# Patient Record
(demographics unavailable — no encounter records)

---

## 2024-11-19 NOTE — PHYSICAL EXAM
[Alert] : alert [Normal Voice/Communication] : normal voice/communication [Healthy Appearing] : healthy appearing [No Acute Distress] : no acute distress [Sclera] : the sclera and conjunctiva were normal [Hearing Threshold Finger Rub Not [place]] : hearing was normal [Normal Lips/Gums] : the lips and gums were normal [Oropharynx] : the oropharynx was normal [Normal Appearance] : the appearance of the neck was normal [No Neck Mass] : no neck mass was observed [No Acc Muscle Use] : no accessory muscle use [No Respiratory Distress] : no respiratory distress [Respiration, Rhythm And Depth] : normal respiratory rhythm and effort [Auscultation Breath Sounds / Voice Sounds] : lungs were clear to auscultation bilaterally [Heart Rate And Rhythm] : heart rate was normal and rhythm regular [Normal S1, S2] : normal S1 and S2 [Murmurs] : no murmurs [Abdomen Tenderness] : non-tender [Bowel Sounds] : normal bowel sounds [No Masses] : no abdominal mass palpated [Abdomen Soft] : soft [] : no hepatosplenomegaly [Oriented To Time, Place, And Person] : oriented to person, place, and time

## 2024-11-19 NOTE — ASSESSMENT
[FreeTextEntry1] : Patient presents for new patient Gastroenterology evaluation because of multiple complex Gastrointestinal signs and symptoms. Patient with prior history of gallbladder polyps and now presents with complex syndrome of abdominal discomfort with ruq abdominal pain which is non radiating.   New diagnostic test ordered ultrasonography of abdomen.  As part of this complex new patient Gastroenterology evaluation, I extensively reviewed patient's medical records including review of laboratory results and prior medical imaging  Moderate degree of complexity of medical decision making involved in this patient encounter

## 2024-11-19 NOTE — HISTORY OF PRESENT ILLNESS
[FreeTextEntry1] : chief complaint: abdominal pain  HPI Patient presents for new patient Gastroenterology evaluation because of multiple complex Gastrointestinal signs and symptoms. Patient with prior history of galbladder polyps, and a positive family history of gallbladder calculi. She has complex syndrome of abdominal discomfort with intermittent crampy ruq abdominal discomfort, there are no exacerbating or ameliorating factors.   No sign of acute gi bleeding such as hematemesis, melena or hematochezia, no dysphagia or odynophagia.   Will order new diagnostic test, ultrasonography of abdomen.  As part of this complex new patient Gastroenterology evaluation, I extensively reviewed patient's medical records including laboratory reports, medical imaging reports and subspecialty consultation.   Moderate degree of complexity of medical decision making involved in this patient encounter

## 2024-12-26 NOTE — HISTORY OF PRESENT ILLNESS
[FreeTextEntry1] : Chief complaint: gallbladder polyp, abdominal pain   HPI: Patient presents for followup of multiple complex gastrointestinal signs and symptoms. Complex syndrome of abdominal pain with intermittent crampy ruq abdominal pain; there are no exacerbating or ameliorating factors. The abdominal pain is non radiating; associated factors include nausea.   No sign of acute gastrointestinal bleeding such as hematemesis, melena or hematochezia, no dysphagia or odynophagia.   Moderate degree of complexity of medical decision making involved in this patient encounter

## 2024-12-26 NOTE — ASSESSMENT
[FreeTextEntry1] : Patient presents for Gastroenterology evaluation because of multiple complex Gastrointestinal signs and symptoms. No sign of acute gastrointestinal signs and symptoms. I reviewed the ultrasound results with the patient and her , and all questions were answered to their satisfaction.   Moderate degree of complexity of medical decision making involved in this patient encounter